# Patient Record
Sex: FEMALE | Race: BLACK OR AFRICAN AMERICAN | Employment: PART TIME | URBAN - METROPOLITAN AREA
[De-identification: names, ages, dates, MRNs, and addresses within clinical notes are randomized per-mention and may not be internally consistent; named-entity substitution may affect disease eponyms.]

---

## 2017-11-13 ENCOUNTER — HOSPITAL ENCOUNTER (OUTPATIENT)
Dept: VASCULAR LAB | Age: 37
Discharge: HOME | End: 2017-11-13
Attending: EMERGENCY MEDICINE | Admitting: EMERGENCY MEDICINE

## 2017-11-13 DIAGNOSIS — M79.604 PAIN OF RIGHT LEG: ICD-10-CM

## 2017-11-13 DIAGNOSIS — M79.661 RIGHT CALF PAIN: Primary | ICD-10-CM

## 2019-04-19 ENCOUNTER — APPOINTMENT (OUTPATIENT)
Dept: VASCULAR LAB | Age: 39
End: 2019-04-19
Payer: COMMERCIAL

## 2019-04-19 ENCOUNTER — HOSPITAL ENCOUNTER (EMERGENCY)
Age: 39
Discharge: HOME | End: 2019-04-19
Attending: EMERGENCY MEDICINE
Payer: COMMERCIAL

## 2019-04-19 VITALS
RESPIRATION RATE: 17 BRPM | HEIGHT: 61 IN | SYSTOLIC BLOOD PRESSURE: 142 MMHG | BODY MASS INDEX: 47.01 KG/M2 | OXYGEN SATURATION: 96 % | TEMPERATURE: 98.2 F | WEIGHT: 249 LBS | DIASTOLIC BLOOD PRESSURE: 83 MMHG | HEART RATE: 76 BPM

## 2019-04-19 DIAGNOSIS — M79.604 RIGHT LEG PAIN: Primary | ICD-10-CM

## 2019-04-19 PROCEDURE — 93971 EXTREMITY STUDY: CPT

## 2019-04-19 PROCEDURE — 99283 EMERGENCY DEPT VISIT LOW MDM: CPT

## 2019-04-19 RX ORDER — METHOCARBAMOL 500 MG/1
500 TABLET, FILM COATED ORAL 3 TIMES DAILY
Qty: 30 TABLET | Refills: 0 | Status: SHIPPED | OUTPATIENT
Start: 2019-04-19 | End: 2019-04-29

## 2019-04-19 RX ORDER — NAPROXEN 500 MG/1
500 TABLET ORAL 2 TIMES DAILY WITH MEALS
Qty: 28 TABLET | Refills: 0 | Status: SHIPPED | OUTPATIENT
Start: 2019-04-19 | End: 2019-05-03

## 2019-04-19 ASSESSMENT — ENCOUNTER SYMPTOMS
RESPIRATORY NEGATIVE: 1
EYES NEGATIVE: 1
GASTROINTESTINAL NEGATIVE: 1

## 2019-04-19 ASSESSMENT — PAIN SCALES - GENERAL: PAINLEVEL_OUTOF10: 7

## 2019-04-19 ASSESSMENT — PAIN DESCRIPTION - PAIN TYPE: TYPE: ACUTE PAIN

## 2019-04-19 NOTE — ED TRIAGE NOTES
Pt arrives with complaints of right lower leg pain. STates she always has some leg pain and it started getting worse in the right lower leg 2 weeks ago. States she can't stand, walk, or sleep due to the pain. Pt ambulated to the room from the waiting room.

## 2019-04-19 NOTE — ED PROVIDER NOTES
[address] [facility]          ATTENDING PHYSICIAN NOTE       Date of evaluation: 2019    Chief Complaint     Leg Pain      History of Present Illness     Gabriela Crespo is a 46 y.o. female who presents to the emergency department complaining of right leg pain. Patient states her last 2 weeks she has been noticing pain in her right leg. She denies any significant swelling of the leg. She denies any chest pain or shortness of breath. She states the pain is worse with movement. She has been taking ibuprofen over-the-counter without improvement of her symptoms. She denies any specific trauma. She states that over the last 2 days she has had difficulty sleeping because of the pain and cannot find a position of comfort which prompted her to come to the emergency department. She does report a history of a provoked DVT when she was pregnant several years ago. Review of Systems     Review of Systems   Constitutional: Negative. HENT: Negative. Eyes: Negative. Respiratory: Negative. Cardiovascular: Negative. Gastrointestinal: Negative. Genitourinary: Negative. Musculoskeletal:        See HPI   Neurological: Negative. All other systems reviewed and are negative. Past Medical, Surgical, Family, and Social History     She has a past medical history of PE (pulmonary embolism). She has a past surgical history that includes Dilation and curettage of uterus;  section (1/13/10); and  section. Her family history is not on file. She reports that she has never smoked. She has never used smokeless tobacco. She reports that she does not drink alcohol or use drugs. Medications     Previous Medications    No medications on file       Allergies     She has No Known Allergies. Physical Exam     INITIAL VITALS: BP: (!) 142/83, Temp: 98.2 °F (36.8 °C), Pulse: 76, Resp: 17, SpO2: 96 %    Physical Exam   Constitutional: No distress.    Obese Cardiovascular: Intact distal pulses. Musculoskeletal:        Right lower leg: She exhibits tenderness. She exhibits no bony tenderness. Tender to palpation along the medial head of the gastrocnemius muscle. Patient complains of pain with flexion and extension of the knee but not significant tenderness with flexion and extension of the ankle. There is no tenderness over the Achilles tendon. There is no significant swelling of the right lower extremity compared to the left. Nursing note and vitals reviewed. Diagnostic Results     RADIOLOGY:  VL Extremity Venous Right    (Results Pending)     RECENT VITALS:  BP: (!) 142/83,Temp: 98.2 °F (36.8 °C), Pulse: 76, Resp: 17, SpO2: 96 %     Procedures     N/A    ED Course     Nursing Notes, Past Medical Hx, Past Surgical Hx, Social Hx,Allergies, and Family Hx were reviewed. The patient was given the following medications:  No orders of the defined types were placed in this encounter. CONSULTS:  None    MEDICAL DECISIONMAKING / ASSESSMENT / PLAN     Keerthi Blunt is a 46 y.o. female who presents complaining of 2 weeks of pain to the right lower extremity. Patient does have tenderness along the medial head of the gastrocnemius muscle. There is no significant edema when compared to the left side. Patient does have a history of provoked DVT so a duplex will be obtained. If this is negative, the patient will follow-up for further evaluation. If positive, the patient will be restarted on anticoagulation. Patient does not currently have a primary care provider so will need to be referred to the [facility]. medicine clinic. Clinical Impression     No diagnosis found. Disposition     PATIENT REFERRED TO:  No follow-up provider specified.     DISCHARGE MEDICATIONS:  New Prescriptions    No medications on file       Jackson Kwok MD  04/19/19 9279

## 2019-04-19 NOTE — ED PROVIDER NOTES
[address] ENCOUNTER          ATTENDING PHYSICIAN NOTE       Date of evaluation: 4/19/2019    ADDENDUM:      Care of this patient was assumed from Dr. Qamar Epstein. The patient was seen for Leg Pain  . The patient's initial evaluation and plan have been discussed with the prior provider who initially evaluated the patient. Nursing Notes, Past Medical Hx, Past Surgical Hx, Social Hx, Allergies, and Family Hx were all reviewed. Diagnostic Results     EKG       RADIOLOGY:  VL Extremity Venous Right             LABS:   No results found for this visit on 04/19/19. RECENT VITALS:  BP: (!) 142/83, Temp: 98.2 °F (36.8 °C), Pulse: 76, Resp: 17, SpO2: 96 %     Procedures         ED Course     The patient was given the following medications:  Orders Placed This Encounter   Medications    methocarbamol (ROBAXIN) 500 MG tablet     Sig: Take 1 tablet by mouth 3 times daily for 10 days     Dispense:  30 tablet     Refill:  0    naproxen (NAPROXEN) 500 MG EC tablet     Sig: Take 1 tablet by mouth 2 times daily (with meals) for 14 days     Dispense:  28 tablet     Refill:  0       CONSULTS:  None    MEDICAL DECISION MAKING / ASSESSMENT / Lior Ofe is a 46 y.o. female who presents with right posterior leg pain. The patient was ordered a duplex of the right lower to me by Dr. Qamar Epstein for concern of DVT. Physical exam did not reveal significant signs for DVT however she had a history of provoked DVT. Duplex did not show any evidence of DVT. Her symptoms are most likely due to muscle/connective tissue injury. She will be started on short course of Robaxin, naproxen, given contact information for sports medicine as well as primary care follow-up. She was discharged home in good condition. Clinical Impression     1.  Right leg pain        Disposition     PATIENT REFERRED TO:  Mallory Tobias MD  [address]  [phone]      As needed, If symptoms [facility] AT [address]  [phone]    Schedule an appointment as soon as possible for a visit         DISCHARGE MEDICATIONS:  New Prescriptions    METHOCARBAMOL (ROBAXIN) 500 MG TABLET    Take 1 tablet by mouth 3 times daily for 10 days    NAPROXEN (NAPROXEN) 500 MG EC TABLET    Take 1 tablet by mouth 2 times daily (with meals) for 14 days       DISPOSITION Decision To Discharge 04/19/2019 07:59:25 AM       Esa Gonzales MD  04/19/19 0442

## 2020-11-01 ENCOUNTER — APPOINTMENT (OUTPATIENT)
Dept: GENERAL RADIOLOGY | Age: 40
End: 2020-11-01
Payer: COMMERCIAL

## 2020-11-01 ENCOUNTER — HOSPITAL ENCOUNTER (EMERGENCY)
Age: 40
Discharge: HOME | End: 2020-11-01
Payer: COMMERCIAL

## 2020-11-01 VITALS
RESPIRATION RATE: 18 BRPM | HEART RATE: 99 BPM | TEMPERATURE: 97.8 F | SYSTOLIC BLOOD PRESSURE: 141 MMHG | DIASTOLIC BLOOD PRESSURE: 79 MMHG | OXYGEN SATURATION: 98 %

## 2020-11-01 LAB — D DIMER: 224 NG/ML DDU (ref 0–229)

## 2020-11-01 PROCEDURE — 85379 FIBRIN DEGRADATION QUANT: CPT

## 2020-11-01 PROCEDURE — 73562 X-RAY EXAM OF KNEE 3: CPT

## 2020-11-01 PROCEDURE — 6360000002 HC RX W HCPCS: Performed by: PHYSICIAN ASSISTANT

## 2020-11-01 PROCEDURE — 99283 EMERGENCY DEPT VISIT LOW MDM: CPT

## 2020-11-01 PROCEDURE — 96372 THER/PROPH/DIAG INJ SC/IM: CPT

## 2020-11-01 RX ORDER — ACETAMINOPHEN 325 MG/1
650 TABLET ORAL EVERY 6 HOURS PRN
Qty: 60 TABLET | Refills: 0 | Status: SHIPPED | OUTPATIENT
Start: 2020-11-01

## 2020-11-01 RX ORDER — OXYCODONE HYDROCHLORIDE 5 MG/1
5 TABLET ORAL EVERY 8 HOURS PRN
Qty: 9 TABLET | Refills: 0 | Status: SHIPPED | OUTPATIENT
Start: 2020-11-01 | End: 2020-11-04

## 2020-11-01 RX ORDER — METHYLPREDNISOLONE 4 MG/1
TABLET ORAL
Qty: 1 KIT | Refills: 0 | Status: SHIPPED | OUTPATIENT
Start: 2020-11-01 | End: 2020-11-07

## 2020-11-01 RX ORDER — KETOROLAC TROMETHAMINE 30 MG/ML
15 INJECTION, SOLUTION INTRAMUSCULAR; INTRAVENOUS ONCE
Status: COMPLETED | OUTPATIENT
Start: 2020-11-01 | End: 2020-11-01

## 2020-11-01 RX ADMIN — KETOROLAC TROMETHAMINE 15 MG: 30 INJECTION, SOLUTION INTRAMUSCULAR at 12:46

## 2020-11-01 ASSESSMENT — PAIN SCALES - GENERAL
PAINLEVEL_OUTOF10: 10
PAINLEVEL_OUTOF10: 10

## 2020-11-01 ASSESSMENT — ENCOUNTER SYMPTOMS
CHEST TIGHTNESS: 0
BACK PAIN: 0
COUGH: 0
RESPIRATORY NEGATIVE: 1
SHORTNESS OF BREATH: 0

## 2020-11-01 NOTE — ED PROVIDER NOTES
Mental Status: She is alert and oriented to person, place, and time. Psychiatric:         Mood and Affect: Mood normal.         Behavior: Behavior normal.         Thought Content: Thought content normal.         Judgment: Judgment normal.         Diagnostic Results       RADIOLOGY:  XR KNEE RIGHT (3 VIEWS)   Final Result      Degenerative arthritis. Mild anterior soft tissue swelling. No other acute findings. LABS:   Results for orders placed or performed during the hospital encounter of 11/01/20   D-dimer, quantitative (Lab)   Result Value Ref Range    D-Dimer, Quant 224 0 - 229 ng/mL DDU           RECENT VITALS:  BP: (!) 141/79, Temp: 97.8 °F (36.6 °C), Pulse: 99, Resp: 18, SpO2: 98 %     Procedures         ED Course     Nursing Notes, Past Medical Hx,Past Surgical Hx, Social Hx, Allergies, and Family Hx were reviewed. The patient was given the following medications:  Orders Placed This Encounter   Medications    ketorolac (TORADOL) injection 15 mg    methylPREDNISolone (MEDROL, OLIVA,) 4 MG tablet     Sig: Take by mouth. Dispense:  1 kit     Refill:  0    oxyCODONE (ROXICODONE) 5 MG immediate release tablet     Sig: Take 1 tablet by mouth every 8 hours as needed for Pain for up to 3 days. WARNING: May cause drowsiness. May impair ability to operate a motor vehicle or machinery. Do not use in combination with alcohol     Dispense:  9 tablet     Refill:  0    acetaminophen (TYLENOL) 325 MG tablet     Sig: Take 2 tablets by mouth every 6 hours as needed for Pain     Dispense:  60 tablet     Refill:  0       CONSULTS:  None    MEDICAL DECISION MAKING / ASSESSMENT / Marylis Deleon is a 45 y.o. female who presented to the emergency department with right knee pain. On examination she is neurovascularly intact. No obvious deformities or dislocations. No erythema or warmth to palpation out because may be suspicious for secondary infection or septic joint.   She had no calf pain on

## 2020-11-01 NOTE — ED NOTES
Patient discharged to home via family. Written discharge instructions reviewed with understanding. Copy of AVS and signed prescription sent home with patient. Patient able to walk from ED without assistance.        Coy Norris RN  11/01/20 9855

## 2020-11-01 NOTE — ED TRIAGE NOTES
Pt is experiencing rt knee pain that runs down back of calf. Pain is causing pt to be unable to sleep.

## 2020-11-01 NOTE — ED NOTES
Bed: B21-21  Expected date:   Expected time:   Means of arrival:   Comments:  23 Darrius Lindsey RN  11/01/20 1214

## 2023-08-30 ENCOUNTER — HOSPITAL ENCOUNTER (EMERGENCY)
Age: 43
Discharge: HOME | End: 2023-08-30
Attending: EMERGENCY MEDICINE
Payer: COMMERCIAL

## 2023-08-30 VITALS
HEIGHT: 61 IN | HEART RATE: 72 BPM | WEIGHT: 250 LBS | TEMPERATURE: 98.2 F | OXYGEN SATURATION: 100 % | BODY MASS INDEX: 47.2 KG/M2 | RESPIRATION RATE: 20 BRPM | DIASTOLIC BLOOD PRESSURE: 100 MMHG | SYSTOLIC BLOOD PRESSURE: 150 MMHG

## 2023-08-30 DIAGNOSIS — R19.7 NAUSEA VOMITING AND DIARRHEA: Primary | ICD-10-CM

## 2023-08-30 DIAGNOSIS — R11.2 NAUSEA VOMITING AND DIARRHEA: Primary | ICD-10-CM

## 2023-08-30 LAB
ALBUMIN SERPL-MCNC: 4.2 G/DL (ref 3.4–5)
ALBUMIN/GLOB SERPL: 1.4 {RATIO} (ref 1.1–2.2)
ALP SERPL-CCNC: 51 U/L (ref 40–129)
ALT SERPL-CCNC: 19 U/L (ref 10–40)
ANION GAP SERPL CALCULATED.3IONS-SCNC: 7 MMOL/L (ref 3–16)
AST SERPL-CCNC: 23 U/L (ref 15–37)
BASOPHILS # BLD: 0 K/UL (ref 0–0.2)
BASOPHILS NFR BLD: 0.6 %
BILIRUB SERPL-MCNC: 0.5 MG/DL (ref 0–1)
BILIRUB UR QL STRIP.AUTO: NEGATIVE
BUN SERPL-MCNC: 11 MG/DL (ref 7–20)
CALCIUM SERPL-MCNC: 9.1 MG/DL (ref 8.3–10.6)
CHLORIDE SERPL-SCNC: 105 MMOL/L (ref 99–110)
CLARITY UR: CLEAR
CO2 SERPL-SCNC: 26 MMOL/L (ref 21–32)
COLOR UR: YELLOW
CREAT SERPL-MCNC: 0.9 MG/DL (ref 0.6–1.1)
DEPRECATED RDW RBC AUTO: 12.9 % (ref 12.4–15.4)
EOSINOPHIL # BLD: 0.2 K/UL (ref 0–0.6)
EOSINOPHIL NFR BLD: 4.5 %
GFR SERPLBLD CREATININE-BSD FMLA CKD-EPI: >60 ML/MIN/{1.73_M2}
GLUCOSE SERPL-MCNC: 103 MG/DL (ref 70–99)
GLUCOSE UR STRIP.AUTO-MCNC: NEGATIVE MG/DL
HCG SERPL QL: NEGATIVE
HCT VFR BLD AUTO: 37.6 % (ref 36–48)
HGB BLD-MCNC: 12.5 G/DL (ref 12–16)
HGB UR QL STRIP.AUTO: NEGATIVE
KETONES UR STRIP.AUTO-MCNC: NEGATIVE MG/DL
LEUKOCYTE ESTERASE UR QL STRIP.AUTO: NEGATIVE
LIPASE SERPL-CCNC: 24 U/L (ref 13–60)
LYMPHOCYTES # BLD: 1.7 K/UL (ref 1–5.1)
LYMPHOCYTES NFR BLD: 36.2 %
MCH RBC QN AUTO: 30.1 PG (ref 26–34)
MCHC RBC AUTO-ENTMCNC: 33.3 G/DL (ref 31–36)
MCV RBC AUTO: 90.5 FL (ref 80–100)
MONOCYTES # BLD: 0.3 K/UL (ref 0–1.3)
MONOCYTES NFR BLD: 7.2 %
NEUTROPHILS # BLD: 2.4 K/UL (ref 1.7–7.7)
NEUTROPHILS NFR BLD: 51.5 %
NITRITE UR QL STRIP.AUTO: NEGATIVE
PH UR STRIP.AUTO: 7 [PH] (ref 5–8)
PLATELET # BLD AUTO: 348 K/UL (ref 135–450)
PMV BLD AUTO: 7.6 FL (ref 5–10.5)
POTASSIUM SERPL-SCNC: 3.8 MMOL/L (ref 3.5–5.1)
PROT SERPL-MCNC: 7.2 G/DL (ref 6.4–8.2)
PROT UR STRIP.AUTO-MCNC: NEGATIVE MG/DL
RBC # BLD AUTO: 4.16 M/UL (ref 4–5.2)
SODIUM SERPL-SCNC: 138 MMOL/L (ref 136–145)
SP GR UR STRIP.AUTO: 1.02 (ref 1–1.03)
UA COMPLETE W REFLEX CULTURE PNL UR: NORMAL
UA DIPSTICK W REFLEX MICRO PNL UR: NORMAL
URN SPEC COLLECT METH UR: NORMAL
UROBILINOGEN UR STRIP-ACNC: 0.2 E.U./DL
WBC # BLD AUTO: 4.7 K/UL (ref 4–11)

## 2023-08-30 PROCEDURE — 99284 EMERGENCY DEPT VISIT MOD MDM: CPT

## 2023-08-30 PROCEDURE — 81003 URINALYSIS AUTO W/O SCOPE: CPT

## 2023-08-30 PROCEDURE — 96374 THER/PROPH/DIAG INJ IV PUSH: CPT

## 2023-08-30 PROCEDURE — 85025 COMPLETE CBC W/AUTO DIFF WBC: CPT

## 2023-08-30 PROCEDURE — 2580000003 HC RX 258: Performed by: EMERGENCY MEDICINE

## 2023-08-30 PROCEDURE — 84703 CHORIONIC GONADOTROPIN ASSAY: CPT

## 2023-08-30 PROCEDURE — 6360000002 HC RX W HCPCS: Performed by: EMERGENCY MEDICINE

## 2023-08-30 PROCEDURE — 80053 COMPREHEN METABOLIC PANEL: CPT

## 2023-08-30 PROCEDURE — 83690 ASSAY OF LIPASE: CPT

## 2023-08-30 RX ORDER — SODIUM CHLORIDE, SODIUM LACTATE, POTASSIUM CHLORIDE, AND CALCIUM CHLORIDE .6; .31; .03; .02 G/100ML; G/100ML; G/100ML; G/100ML
1000 INJECTION, SOLUTION INTRAVENOUS ONCE
Status: COMPLETED | OUTPATIENT
Start: 2023-08-30 | End: 2023-08-30

## 2023-08-30 RX ORDER — ONDANSETRON 4 MG/1
4 TABLET, FILM COATED ORAL EVERY 8 HOURS PRN
Qty: 20 TABLET | Refills: 0 | Status: SHIPPED | OUTPATIENT
Start: 2023-08-30 | End: 2023-08-30 | Stop reason: SDUPTHER

## 2023-08-30 RX ORDER — ONDANSETRON 2 MG/ML
4 INJECTION INTRAMUSCULAR; INTRAVENOUS ONCE
Status: COMPLETED | OUTPATIENT
Start: 2023-08-30 | End: 2023-08-30

## 2023-08-30 RX ORDER — ONDANSETRON 4 MG/1
4 TABLET, FILM COATED ORAL EVERY 8 HOURS PRN
Qty: 20 TABLET | Refills: 0 | Status: SHIPPED | OUTPATIENT
Start: 2023-08-30

## 2023-08-30 RX ADMIN — SODIUM CHLORIDE, POTASSIUM CHLORIDE, SODIUM LACTATE AND CALCIUM CHLORIDE 1000 ML: 600; 310; 30; 20 INJECTION, SOLUTION INTRAVENOUS at 09:51

## 2023-08-30 RX ADMIN — ONDANSETRON 4 MG: 2 INJECTION INTRAMUSCULAR; INTRAVENOUS at 09:51

## 2023-08-30 NOTE — DISCHARGE INSTRUCTIONS
Return to emergency department for worsening symptoms or other concerns. Come back if you have pain, nausea is not helped with the Zofran, or any other problems. Drink plenty of fluids to maintain hydration is much as you can, and start with a relatively bland diet, things like crackers toast low-fat low spice food and you can then advance as tolerated.

## 2024-01-02 ENCOUNTER — HOSPITAL ENCOUNTER (EMERGENCY)
Age: 44
Discharge: HOME | End: 2024-01-02
Attending: EMERGENCY MEDICINE
Payer: COMMERCIAL

## 2024-01-02 ENCOUNTER — APPOINTMENT (OUTPATIENT)
Dept: CT IMAGING | Age: 44
End: 2024-01-02
Payer: COMMERCIAL

## 2024-01-02 VITALS
OXYGEN SATURATION: 97 % | SYSTOLIC BLOOD PRESSURE: 123 MMHG | BODY MASS INDEX: 48.5 KG/M2 | TEMPERATURE: 98.8 F | DIASTOLIC BLOOD PRESSURE: 68 MMHG | HEART RATE: 94 BPM | RESPIRATION RATE: 18 BRPM | WEIGHT: 256.9 LBS | HEIGHT: 61 IN

## 2024-01-02 DIAGNOSIS — L02.211 ABDOMINAL WALL ABSCESS: Primary | ICD-10-CM

## 2024-01-02 LAB
ALBUMIN SERPL-MCNC: 4.3 G/DL (ref 3.4–5)
ALBUMIN/GLOB SERPL: 1.3 {RATIO} (ref 1.1–2.2)
ALP SERPL-CCNC: 57 U/L (ref 40–129)
ALT SERPL-CCNC: 17 U/L (ref 10–40)
ANION GAP SERPL CALCULATED.3IONS-SCNC: 9 MMOL/L (ref 3–16)
AST SERPL-CCNC: 19 U/L (ref 15–37)
BILIRUB SERPL-MCNC: 0.4 MG/DL (ref 0–1)
BUN SERPL-MCNC: 12 MG/DL (ref 7–20)
CALCIUM SERPL-MCNC: 9.3 MG/DL (ref 8.3–10.6)
CHLORIDE SERPL-SCNC: 105 MMOL/L (ref 99–110)
CO2 SERPL-SCNC: 26 MMOL/L (ref 21–32)
CREAT SERPL-MCNC: 0.8 MG/DL (ref 0.6–1.1)
GFR SERPLBLD CREATININE-BSD FMLA CKD-EPI: >60 ML/MIN/{1.73_M2}
GLUCOSE SERPL-MCNC: 93 MG/DL (ref 70–99)
HCG UR QL: NEGATIVE
LACTATE BLDV-SCNC: 0.8 MMOL/L (ref 0.4–2)
POTASSIUM SERPL-SCNC: 4 MMOL/L (ref 3.5–5.1)
PROT SERPL-MCNC: 7.6 G/DL (ref 6.4–8.2)
SODIUM SERPL-SCNC: 140 MMOL/L (ref 136–145)

## 2024-01-02 PROCEDURE — 74177 CT ABD & PELVIS W/CONTRAST: CPT

## 2024-01-02 PROCEDURE — 80053 COMPREHEN METABOLIC PANEL: CPT

## 2024-01-02 PROCEDURE — 6360000004 HC RX CONTRAST MEDICATION: Performed by: EMERGENCY MEDICINE

## 2024-01-02 PROCEDURE — 83605 ASSAY OF LACTIC ACID: CPT

## 2024-01-02 PROCEDURE — 84703 CHORIONIC GONADOTROPIN ASSAY: CPT

## 2024-01-02 PROCEDURE — 99285 EMERGENCY DEPT VISIT HI MDM: CPT

## 2024-01-02 RX ORDER — SULFAMETHOXAZOLE AND TRIMETHOPRIM 800; 160 MG/1; MG/1
1 TABLET ORAL 2 TIMES DAILY
Qty: 14 TABLET | Refills: 0 | Status: SHIPPED | OUTPATIENT
Start: 2024-01-02 | End: 2024-01-09

## 2024-01-02 RX ADMIN — IOPAMIDOL 75 ML: 755 INJECTION, SOLUTION INTRAVENOUS at 14:24

## 2024-01-02 ASSESSMENT — PAIN DESCRIPTION - LOCATION: LOCATION: ABDOMEN

## 2024-01-02 ASSESSMENT — PAIN - FUNCTIONAL ASSESSMENT
PAIN_FUNCTIONAL_ASSESSMENT: NONE - DENIES PAIN
PAIN_FUNCTIONAL_ASSESSMENT: 0-10

## 2024-01-02 ASSESSMENT — PAIN SCALES - GENERAL: PAINLEVEL_OUTOF10: 8

## 2024-01-02 ASSESSMENT — PAIN DESCRIPTION - DESCRIPTORS: DESCRIPTORS: ACHING

## 2024-01-02 ASSESSMENT — PAIN DESCRIPTION - PAIN TYPE: TYPE: ACUTE PAIN

## 2024-01-02 ASSESSMENT — PAIN DESCRIPTION - ORIENTATION: ORIENTATION: RIGHT

## 2024-01-02 ASSESSMENT — PAIN DESCRIPTION - FREQUENCY: FREQUENCY: CONTINUOUS

## 2024-01-02 NOTE — ED PROVIDER NOTES
density, possibly phlegmon, but no evidence for drainable fluid collection or rim-enhancing abscess           EMERGENCY DEPARTMENT COURSE and DIFFERENTIAL DIAGNOSIS/MDM:     Vitals:    Vitals:    01/02/24 1258   BP: 123/68   Pulse: 94   Resp: 18   Temp: 98.8 °F (37.1 °C)   TempSrc: Oral   SpO2: 97%   Weight: 116.5 kg (256 lb 14.4 oz)   Height: 1.549 m (5' 1\")        Patient was given the following medications:   Medications   iopamidol (ISOVUE-370) 76 % injection 75 mL (75 mLs IntraVENous Given 1/2/24 1424)             CC/HPI Summary, DDx, ED Course, and Reassessment:     Patient presents to the emergency department with right sided abdominal wall abscess.  Patient request wound check.  On exam, patient is afebrile with normal vital signs.  She has what appears to be draining abscess of the right lateral abdominal wall.  Mild overlying erythema.  Wound is indurated without fluctuance.  Labs are stable.  CT abdomen/pelvis was completed to ensure no bowel involvement.  Phlegmon without drainable abscess reported.  Patient will continue on oral antibiotics and follow-up with PCP as recommended.       CONSULTS: None   Social Determinants: None   Chronic Conditions: NA    Disposition Considerations: None     I am the primary physician of Record.     FINAL IMPRESSION    1. Abdominal wall abscess         DISPOSITION/PLAN   DISPOSITION Decision To Discharge 01/02/2024 02:57:29 PM       PATIENT REFERRED TO:   Tao Mendiola MD  [address] [facility]  [address]  [facility] [address]  [phone]    In 3 days      [facility] Emergency Department  [address] [facility] [address]  [phone]    If symptoms worsen     DISCHARGE MEDICATIONS:   Discharge Medication List as of 1/2/2024  3:03 PM        START taking these medications    Details   sulfamethoxazole-trimethoprim (BACTRIM DS;SEPTRA DS) 800-160 MG per tablet Take 1 tablet by mouth 2 times daily for 7 days, Disp-14 tablet, R-0Normal            DISCONTINUED

## 2024-01-02 NOTE — DISCHARGE INSTR - COC
I certify the above information and transfer of Courtney Steiner  is necessary for the continuing treatment of the diagnosis listed and that she requires {Admit to Appropriate Level of Care:57989} for {GREATER/LESS:252283911} 30 days.     Update Admission H&P: {CHP DME Changes in HandP:848160300}    PHYSICIAN SIGNATURE:  {Esignature:208920765}

## 2024-03-15 ENCOUNTER — HOSPITAL ENCOUNTER (EMERGENCY)
Age: 44
Discharge: HOME | End: 2024-03-15
Attending: EMERGENCY MEDICINE
Payer: COMMERCIAL

## 2024-03-15 VITALS
HEART RATE: 83 BPM | BODY MASS INDEX: 49.43 KG/M2 | HEIGHT: 62 IN | RESPIRATION RATE: 12 BRPM | WEIGHT: 268.6 LBS | OXYGEN SATURATION: 100 % | DIASTOLIC BLOOD PRESSURE: 84 MMHG | TEMPERATURE: 97.6 F | SYSTOLIC BLOOD PRESSURE: 143 MMHG

## 2024-03-15 DIAGNOSIS — G56.01 CARPAL TUNNEL SYNDROME OF RIGHT WRIST: Primary | ICD-10-CM

## 2024-03-15 DIAGNOSIS — R03.0 ELEVATED BLOOD PRESSURE READING: ICD-10-CM

## 2024-03-15 PROCEDURE — 99283 EMERGENCY DEPT VISIT LOW MDM: CPT

## 2024-03-15 RX ORDER — ACETAMINOPHEN 500 MG
500 TABLET ORAL EVERY 6 HOURS PRN
Qty: 30 TABLET | Refills: 0 | Status: SHIPPED | OUTPATIENT
Start: 2024-03-15

## 2024-03-15 RX ORDER — NAPROXEN 375 MG/1
375 TABLET ORAL 2 TIMES DAILY WITH MEALS
Qty: 20 TABLET | Refills: 0 | Status: SHIPPED | OUTPATIENT
Start: 2024-03-15

## 2024-03-15 ASSESSMENT — PAIN SCALES - GENERAL
PAINLEVEL_OUTOF10: 10
PAINLEVEL_OUTOF10: 10

## 2024-03-15 ASSESSMENT — PAIN - FUNCTIONAL ASSESSMENT: PAIN_FUNCTIONAL_ASSESSMENT: 0-10

## 2024-03-15 ASSESSMENT — PAIN DESCRIPTION - DESCRIPTORS: DESCRIPTORS: SHARP

## 2024-03-15 ASSESSMENT — PAIN DESCRIPTION - LOCATION
LOCATION: HAND
LOCATION: HAND

## 2024-03-15 ASSESSMENT — PAIN DESCRIPTION - PAIN TYPE
TYPE: ACUTE PAIN
TYPE: ACUTE PAIN

## 2024-03-15 ASSESSMENT — PAIN DESCRIPTION - ORIENTATION
ORIENTATION: RIGHT
ORIENTATION: RIGHT

## 2024-03-15 NOTE — ED PROVIDER NOTES
the patient reproduces her symptoms exactly.  Her cap refill is normal.  Radial pulses normal.  Sensation light touch is intact.  Radial, median, ulnar nerve motor functions intact.  SKIN: Warm and dry.  No rash  NEUROLOGICAL: Normal speech and thought      DIAGNOSTIC RESULTS   LABS:   Labs Reviewed - No data to display   When ordered only abnormal lab results are displayed. All other labs were within normal range or not returned as of this dictation.     EKG:      RADIOLOGY:    Non-plain film images such as CT, Ultrasound and MRI are read by the radiologist. Plain radiographic images are visualized and preliminarily interpreted by the ED Provider with the below findings:      Interpretation per the Radiologist below, if available at the time of this note:  No results found.         EMERGENCY DEPARTMENT COURSE and DIFFERENTIAL DIAGNOSIS/MDM:     Vitals:    Vitals:    03/15/24 1320   BP: (!) 143/84   Pulse: 83   Resp: 12   Temp: 97.6 °F (36.4 °C)   TempSrc: Oral   SpO2: 100%   Weight: 121.8 kg (268 lb 9.6 oz)   Height: 1.575 m (5' 2\")        Patient was given the following medications:   Medications - No data to display          CC/HPI Summary, DDx, ED Course, and Reassessment: At this time I do not suspect a fracture or dislocation.  There is no cellulitis.  Her pulses are normal so do not suspect an arterial injury.  The patient's Tinel's test is positive and reproduces her symptoms.  At this time I feel the patient has carpal tunnel.  She will be started into a brace.  Nonsteroidals and Tylenol.  Follow-up with her primary care provider in 1 week see how she is doing and also to recheck her elevated blood pressure.    CONSULTS: None   Social Determinants: None   Chronic Conditions: NA    Disposition Considerations: None      I am the primary physician of Record.     FINAL IMPRESSION    1. Carpal tunnel syndrome of right wrist    2. Elevated blood pressure reading         DISPOSITION/PLAN   DISPOSITION Discharge -  Pending Orders Complete 03/15/2024 01:31:37 PM       PATIENT REFERRED TO:   Tao Mendiola MD  [address] [id number]  [phone]    Schedule an appointment as soon as possible for a visit in 1 week  For Blood Pressure recheck     DISCHARGE MEDICATIONS:   New Prescriptions    ACETAMINOPHEN (TYLENOL) 500 MG TABLET    Take 1 tablet by mouth every 6 hours as needed for Pain    NAPROXEN (NAPROSYN) 375 MG TABLET    Take 1 tablet by mouth 2 times daily (with meals)      DISCONTINUED MEDICATIONS:   Discontinued Medications    No medications on file            (Please note that portions of this note were completed with a voice recognition program.  Efforts were made to edit the dictations but occasionally words are mis-transcribed.)     Toro Miller MD (electronically signed)         Toro Miller MD  03/15/24 2259

## 2024-03-19 ENCOUNTER — HOSPITAL ENCOUNTER (EMERGENCY)
Age: 44
Discharge: HOME | End: 2024-03-19
Attending: EMERGENCY MEDICINE
Payer: COMMERCIAL

## 2024-03-19 VITALS
HEART RATE: 77 BPM | RESPIRATION RATE: 16 BRPM | OXYGEN SATURATION: 98 % | BODY MASS INDEX: 47.84 KG/M2 | TEMPERATURE: 98.4 F | DIASTOLIC BLOOD PRESSURE: 92 MMHG | HEIGHT: 62 IN | SYSTOLIC BLOOD PRESSURE: 150 MMHG | WEIGHT: 260 LBS

## 2024-03-19 DIAGNOSIS — H11.422 CHEMOSIS OF LEFT CONJUNCTIVA: ICD-10-CM

## 2024-03-19 DIAGNOSIS — B34.9 NONSPECIFIC SYNDROME SUGGESTIVE OF VIRAL ILLNESS: Primary | ICD-10-CM

## 2024-03-19 LAB
BACTERIA URNS QL MICRO: ABNORMAL /HPF
BILIRUB UR QL STRIP.AUTO: NEGATIVE
CLARITY UR: ABNORMAL
COLOR UR: YELLOW
EPI CELLS #/AREA URNS HPF: ABNORMAL /HPF (ref 0–5)
GLUCOSE UR STRIP.AUTO-MCNC: NEGATIVE MG/DL
HCG UR QL: NEGATIVE
HGB UR QL STRIP.AUTO: ABNORMAL
KETONES UR STRIP.AUTO-MCNC: 15 MG/DL
LEUKOCYTE ESTERASE UR QL STRIP.AUTO: NEGATIVE
NITRITE UR QL STRIP.AUTO: NEGATIVE
PH UR STRIP.AUTO: 6.5 [PH] (ref 5–8)
PROT UR STRIP.AUTO-MCNC: 30 MG/DL
RBC #/AREA URNS HPF: >100 /HPF (ref 0–4)
SP GR UR STRIP.AUTO: 1.02 (ref 1–1.03)
UA COMPLETE W REFLEX CULTURE PNL UR: ABNORMAL
UA DIPSTICK W REFLEX MICRO PNL UR: YES
URN SPEC COLLECT METH UR: ABNORMAL
UROBILINOGEN UR STRIP-ACNC: 1 E.U./DL
WBC #/AREA URNS HPF: ABNORMAL /HPF (ref 0–5)

## 2024-03-19 PROCEDURE — 84703 CHORIONIC GONADOTROPIN ASSAY: CPT

## 2024-03-19 PROCEDURE — 81001 URINALYSIS AUTO W/SCOPE: CPT

## 2024-03-19 PROCEDURE — 6370000000 HC RX 637 (ALT 250 FOR IP): Performed by: EMERGENCY MEDICINE

## 2024-03-19 PROCEDURE — 99283 EMERGENCY DEPT VISIT LOW MDM: CPT

## 2024-03-19 RX ORDER — ONDANSETRON 4 MG/1
4 TABLET, ORALLY DISINTEGRATING ORAL 3 TIMES DAILY PRN
Qty: 21 TABLET | Refills: 0 | Status: SHIPPED | OUTPATIENT
Start: 2024-03-19

## 2024-03-19 RX ORDER — ACETAMINOPHEN 325 MG/1
650 TABLET ORAL ONCE
Status: COMPLETED | OUTPATIENT
Start: 2024-03-19 | End: 2024-03-19

## 2024-03-19 RX ORDER — ONDANSETRON 4 MG/1
8 TABLET, ORALLY DISINTEGRATING ORAL ONCE
Status: COMPLETED | OUTPATIENT
Start: 2024-03-19 | End: 2024-03-19

## 2024-03-19 RX ADMIN — ONDANSETRON 8 MG: 4 TABLET, ORALLY DISINTEGRATING ORAL at 12:14

## 2024-03-19 RX ADMIN — ACETAMINOPHEN 650 MG: 325 TABLET ORAL at 12:14

## 2024-03-19 NOTE — ED PROVIDER NOTES
[facility] EMERGENCY DEPARTMENT  EMERGENCY DEPARTMENT ENCOUNTER      Pt Name: Courtney Steiner  MRN: [id number]  Birthdate 1980  Date of evaluation: 3/19/2024  Provider: Brennen Borges MD    CHIEF COMPLAINT       Chief Complaint   Patient presents with    Emesis     Pt c/o N/V/D since Monday. States she had chicken on Sunday and thinks she may have food poisoning.         HISTORY OF PRESENT ILLNESS   (Location/Symptom, Timing/Onset, Context/Setting, Quality, Duration, Modifying Factors, Severity)  Note limiting factors.   Courtney Steiner is a 44 y.o. female who presents to the emergency department with the chief complaint of   Chief Complaint   Patient presents with    Emesis     Pt c/o N/V/D since Monday. States she had chicken on Sunday and thinks she may have food poisoning.   . 44-year-old female with past medical history as listed below and recent diagnosis of carpal tunnel syndrome presents to the ER for evaluation of constellation of complaints.  Patient states she has had nausea and several episodes of nonbilious nonbloody vomiting.  Patient states this started on Monday after she ate what she thought was some bad food.  Symptoms are also associated with loose watery yellow diarrhea.  Patient also describes intermittent chills, a frontal headache, myalgias.  Patient denies measured fevers, shortness of breath, chest pain, urinary symptoms or vaginal discharge.  Of note patient states she had her eyes evaluated last week and since then she has noticed a small film on the lateral aspect of her left eye.  Patient denies changes in vision, pain or trauma to the area.        Nursing Notes were reviewed.    REVIEW OF SYSTEMS    (2-9 systems for level 4, 10 or more for level 5)     Review of Systems   All other systems reviewed and are negative.      Except as noted above the remainder of the review of systems was reviewed and negative.       PAST MEDICAL HISTORY     Past Medical History:   Diagnosis Date    PE

## 2024-03-19 NOTE — DISCHARGE INSTRUCTIONS
Your urinalysis did not show any obvious signs of dehydration.  You have a small amount of ketones in your urine.  Please take Zofran for your nausea and vomiting.  Of note it will help slightly with the diarrhea as it does cause constipation sometimes.  If symptoms yourprogress despite treatment please come back to the ER for repeat evaluation

## 2024-08-24 ENCOUNTER — HOSPITAL ENCOUNTER (EMERGENCY)
Age: 44
Discharge: HOME | End: 2024-08-24
Attending: EMERGENCY MEDICINE

## 2024-08-24 VITALS
RESPIRATION RATE: 16 BRPM | WEIGHT: 268.74 LBS | TEMPERATURE: 98.3 F | BODY MASS INDEX: 50.74 KG/M2 | OXYGEN SATURATION: 98 % | HEIGHT: 61 IN | SYSTOLIC BLOOD PRESSURE: 141 MMHG | DIASTOLIC BLOOD PRESSURE: 75 MMHG | HEART RATE: 88 BPM

## 2024-08-24 DIAGNOSIS — N93.8 DUB (DYSFUNCTIONAL UTERINE BLEEDING): Primary | ICD-10-CM

## 2024-08-24 LAB
ALBUMIN SERPL-MCNC: 4.2 G/DL (ref 3.4–5)
ALBUMIN/GLOB SERPL: 1.4 {RATIO} (ref 1.1–2.2)
ALP SERPL-CCNC: 55 U/L (ref 40–129)
ALT SERPL-CCNC: 30 U/L (ref 10–40)
ANION GAP SERPL CALCULATED.3IONS-SCNC: 10 MMOL/L (ref 3–16)
AST SERPL-CCNC: 29 U/L (ref 15–37)
BACTERIA URNS QL MICRO: ABNORMAL /HPF
BASOPHILS # BLD: 0.1 K/UL (ref 0–0.2)
BASOPHILS NFR BLD: 0.9 %
BILIRUB SERPL-MCNC: 0.3 MG/DL (ref 0–1)
BILIRUB UR QL STRIP.AUTO: NEGATIVE
BUN SERPL-MCNC: 14 MG/DL (ref 7–20)
CALCIUM SERPL-MCNC: 8.9 MG/DL (ref 8.3–10.6)
CHLORIDE SERPL-SCNC: 105 MMOL/L (ref 99–110)
CLARITY UR: CLEAR
CO2 SERPL-SCNC: 21 MMOL/L (ref 21–32)
COLOR UR: YELLOW
CREAT SERPL-MCNC: 0.7 MG/DL (ref 0.6–1.1)
DEPRECATED RDW RBC AUTO: 14.4 % (ref 12.4–15.4)
EOSINOPHIL # BLD: 0.2 K/UL (ref 0–0.6)
EOSINOPHIL NFR BLD: 2.9 %
EPI CELLS #/AREA URNS HPF: ABNORMAL /HPF (ref 0–5)
GFR SERPLBLD CREATININE-BSD FMLA CKD-EPI: >90 ML/MIN/{1.73_M2}
GLUCOSE SERPL-MCNC: 101 MG/DL (ref 70–99)
GLUCOSE UR STRIP.AUTO-MCNC: NEGATIVE MG/DL
HCG UR QL: NEGATIVE
HCT VFR BLD AUTO: 31.4 % (ref 36–48)
HGB BLD-MCNC: 10.3 G/DL (ref 12–16)
HGB UR QL STRIP.AUTO: ABNORMAL
KETONES UR STRIP.AUTO-MCNC: NEGATIVE MG/DL
LEUKOCYTE ESTERASE UR QL STRIP.AUTO: NEGATIVE
LYMPHOCYTES # BLD: 2.3 K/UL (ref 1–5.1)
LYMPHOCYTES NFR BLD: 38.6 %
MCH RBC QN AUTO: 27.1 PG (ref 26–34)
MCHC RBC AUTO-ENTMCNC: 32.7 G/DL (ref 31–36)
MCV RBC AUTO: 82.9 FL (ref 80–100)
MONOCYTES # BLD: 0.4 K/UL (ref 0–1.3)
MONOCYTES NFR BLD: 7.1 %
NEUTROPHILS # BLD: 3 K/UL (ref 1.7–7.7)
NEUTROPHILS NFR BLD: 50.5 %
NITRITE UR QL STRIP.AUTO: POSITIVE
PH UR STRIP.AUTO: 7 [PH] (ref 5–8)
PLATELET # BLD AUTO: 390 K/UL (ref 135–450)
PMV BLD AUTO: 7.8 FL (ref 5–10.5)
POTASSIUM SERPL-SCNC: 4.2 MMOL/L (ref 3.5–5.1)
PROT SERPL-MCNC: 7.3 G/DL (ref 6.4–8.2)
PROT UR STRIP.AUTO-MCNC: NEGATIVE MG/DL
RBC # BLD AUTO: 3.79 M/UL (ref 4–5.2)
RBC #/AREA URNS HPF: >100 /HPF (ref 0–4)
SODIUM SERPL-SCNC: 136 MMOL/L (ref 136–145)
SP GR UR STRIP.AUTO: 1.02 (ref 1–1.03)
UA DIPSTICK W REFLEX MICRO PNL UR: YES
URN SPEC COLLECT METH UR: ABNORMAL
UROBILINOGEN UR STRIP-ACNC: 1 E.U./DL
WBC # BLD AUTO: 5.8 K/UL (ref 4–11)
WBC #/AREA URNS HPF: ABNORMAL /HPF (ref 0–5)

## 2024-08-24 PROCEDURE — 84703 CHORIONIC GONADOTROPIN ASSAY: CPT

## 2024-08-24 PROCEDURE — 2580000003 HC RX 258: Performed by: EMERGENCY MEDICINE

## 2024-08-24 PROCEDURE — 81001 URINALYSIS AUTO W/SCOPE: CPT

## 2024-08-24 PROCEDURE — 85025 COMPLETE CBC W/AUTO DIFF WBC: CPT

## 2024-08-24 PROCEDURE — 99284 EMERGENCY DEPT VISIT MOD MDM: CPT

## 2024-08-24 PROCEDURE — 80053 COMPREHEN METABOLIC PANEL: CPT

## 2024-08-24 RX ORDER — 0.9 % SODIUM CHLORIDE 0.9 %
1000 INTRAVENOUS SOLUTION INTRAVENOUS ONCE
Status: COMPLETED | OUTPATIENT
Start: 2024-08-24 | End: 2024-08-24

## 2024-08-24 RX ORDER — ONDANSETRON 4 MG/1
4 TABLET, FILM COATED ORAL EVERY 8 HOURS PRN
Qty: 10 TABLET | Refills: 0 | Status: SHIPPED | OUTPATIENT
Start: 2024-08-24

## 2024-08-24 RX ORDER — MEDROXYPROGESTERONE ACETATE 10 MG
10 TABLET ORAL DAILY
Status: DISCONTINUED | OUTPATIENT
Start: 2024-08-24 | End: 2024-08-24 | Stop reason: RX

## 2024-08-24 RX ORDER — MEDROXYPROGESTERONE ACETATE 10 MG
10 TABLET ORAL DAILY
Qty: 30 TABLET | Refills: 0 | Status: SHIPPED | OUTPATIENT
Start: 2024-08-24

## 2024-08-24 RX ADMIN — SODIUM CHLORIDE 1000 ML: 0.9 INJECTION, SOLUTION INTRAVENOUS at 13:00

## 2024-08-24 ASSESSMENT — ENCOUNTER SYMPTOMS
SHORTNESS OF BREATH: 0
VOMITING: 0
BLOOD IN STOOL: 0
SORE THROAT: 0
COLOR CHANGE: 0
PHOTOPHOBIA: 0
BACK PAIN: 0
RECTAL PAIN: 0
WHEEZING: 0
APNEA: 0
DIARRHEA: 0
CHEST TIGHTNESS: 0
TROUBLE SWALLOWING: 0
SINUS PRESSURE: 0
ABDOMINAL DISTENTION: 0
EYE DISCHARGE: 0
EYE REDNESS: 0
EYE PAIN: 0
COUGH: 0
ABDOMINAL PAIN: 1
VOICE CHANGE: 0
RHINORRHEA: 0
STRIDOR: 0
NAUSEA: 0
CONSTIPATION: 0

## 2024-08-24 ASSESSMENT — PAIN DESCRIPTION - DESCRIPTORS: DESCRIPTORS: ACHING

## 2024-08-24 ASSESSMENT — PAIN SCALES - GENERAL: PAINLEVEL_OUTOF10: 9

## 2024-08-24 ASSESSMENT — PAIN - FUNCTIONAL ASSESSMENT: PAIN_FUNCTIONAL_ASSESSMENT: 0-10

## 2024-08-24 ASSESSMENT — PAIN DESCRIPTION - FREQUENCY: FREQUENCY: CONTINUOUS

## 2024-08-24 NOTE — ED NOTES
Pelvic exam completed, blood dripping from vagina to floor, multiple blood clots. Pelvic exam completed per EMD, pt tolerated without difficulty.

## 2024-08-24 NOTE — ED PROVIDER NOTES
for color change and rash.   Neurological:  Positive for dizziness. Negative for tremors, seizures, syncope, facial asymmetry, speech difficulty, weakness, light-headedness, numbness and headaches.   Hematological:  Negative for adenopathy. Does not bruise/bleed easily.   Psychiatric/Behavioral:  Negative for agitation, confusion, dysphoric mood, hallucinations, self-injury, sleep disturbance and suicidal ideas.    All other systems reviewed and are negative.       Physical Exam  Constitutional:       General: She is not in acute distress.     Appearance: Normal appearance. She is obese. She is not ill-appearing.   HENT:      Head: Normocephalic and atraumatic.   Eyes:      Extraocular Movements: Extraocular movements intact.      Pupils: Pupils are equal, round, and reactive to light.   Abdominal:      General: Bowel sounds are normal.      Palpations: Abdomen is soft.   Skin:     General: Skin is warm and dry.      Capillary Refill: Capillary refill takes less than 2 seconds.   Neurological:      General: No focal deficit present.      Mental Status: She is alert and oriented to person, place, and time.   Psychiatric:         Mood and Affect: Mood normal.         Behavior: Behavior normal.     Consultation with Gynecologist, Dr. Starr at 1:30 pm  He recommends Provera 10-20 mg/day. He will follow her up in the office on Monday.     MDM    Results for orders placed or performed during the hospital encounter of 08/24/24   Pregnancy, Urine   Result Value Ref Range    Pregnancy, Urine Negative Detects HCG level >20 MIU/mL   Urinalysis   Result Value Ref Range    Color, UA Yellow Straw/Yellow    Clarity, UA Clear Clear    Glucose, Ur Negative Negative mg/dL    Bilirubin, Urine Negative Negative    Ketones, Urine Negative Negative mg/dL    Specific Gravity, UA 1.020 1.005 - 1.030    Blood, Urine LARGE (A) Negative    pH, Urine 7.0 5.0 - 8.0    Protein, UA Negative Negative mg/dL    Urobilinogen, Urine 1.0 <2.0 E.U./dL     Nitrite, Urine POSITIVE (A) Negative    Leukocyte Esterase, Urine Negative Negative    Microscopic Examination YES     Urine Type NotGiven    Microscopic Urinalysis   Result Value Ref Range    WBC, UA 3-5 0 - 5 /HPF    RBC, UA >100 (A) 0 - 4 /HPF    Epithelial Cells, UA 2-5 0 - 5 /HPF    Bacteria, UA 3+ (A) None Seen /HPF   CBC with Auto Differential   Result Value Ref Range    WBC 5.8 4.0 - 11.0 K/uL    RBC 3.79 (L) 4.00 - 5.20 M/uL    Hemoglobin 10.3 (L) 12.0 - 16.0 g/dL    Hematocrit 31.4 (L) 36.0 - 48.0 %    MCV 82.9 80.0 - 100.0 fL    MCH 27.1 26.0 - 34.0 pg    MCHC 32.7 31.0 - 36.0 g/dL    RDW 14.4 12.4 - 15.4 %    Platelets 390 135 - 450 K/uL    MPV 7.8 5.0 - 10.5 fL    Neutrophils % 50.5 %    Lymphocytes % 38.6 %    Monocytes % 7.1 %    Eosinophils % 2.9 %    Basophils % 0.9 %    Neutrophils Absolute 3.0 1.7 - 7.7 K/uL    Lymphocytes Absolute 2.3 1.0 - 5.1 K/uL    Monocytes Absolute 0.4 0.0 - 1.3 K/uL    Eosinophils Absolute 0.2 0.0 - 0.6 K/uL    Basophils Absolute 0.1 0.0 - 0.2 K/uL   Comprehensive Metabolic Panel w/ Reflex to MG   Result Value Ref Range    Sodium 136 136 - 145 mmol/L    Potassium reflex Magnesium 4.2 3.5 - 5.1 mmol/L    Chloride 105 99 - 110 mmol/L    CO2 21 21 - 32 mmol/L    Anion Gap 10 3 - 16    Glucose 101 (H) 70 - 99 mg/dL    BUN 14 7 - 20 mg/dL    Creatinine 0.7 0.6 - 1.1 mg/dL    Est, Glom Filt Rate >90 >60    Calcium 8.9 8.3 - 10.6 mg/dL    Total Protein 7.3 6.4 - 8.2 g/dL    Albumin 4.2 3.4 - 5.0 g/dL    Albumin/Globulin Ratio 1.4 1.1 - 2.2    Total Bilirubin 0.3 0.0 - 1.0 mg/dL    Alkaline Phosphatase 55 40 - 129 U/L    ALT 30 10 - 40 U/L    AST 29 15 - 37 U/L       I estimate there is LOW risk for ACUTE APPENDICITIS, BOWEL OBSTRUCTION, CHOLECYSTITIS, DIVERTICULITIS, INCARCERATED HERNIA, PANCREATITIS, PELVIC INFLAMMATORY DISEASE, PERFORATED BOWEL or ULCER, PREGNANCY, or TUBO-OVARIAN ABSCESS, thus I consider the discharge disposition reasonable. Also, there is no evidence or  peritonitis, sepsis, or toxicity. Courtney Steiner and I have discussed the diagnosis and risks, and we agree with discharging home to follow-up with their primary doctor. We also discussed returning to the Emergency Department immediately if new or worsening symptoms occur. We have discussed the symptoms which are most concerning (e.g., bloody stool, fever, changing or worsening pain, vomiting) that necessitate immediate return.     Final Impression    1. DUB (dysfunctional uterine bleeding)        Blood pressure (!) 141/75, pulse 88, temperature 98.3 °F (36.8 °C), temperature source Oral, resp. rate 16, height 1.549 m (5' 1\"), weight 121.9 kg (268 lb 11.9 oz), last menstrual period 06/15/2024, SpO2 98%, unknown if currently breastfeeding.             Mira Blanc MD  08/24/24 7930

## 2024-08-24 NOTE — ED TRIAGE NOTES
Vaginal bleeding since July. Had normal period in June and then started bleeding in July, got worse on Monday, + suprapubic pain. Denies dysuria. Has used 4 pads since 0300, feels weak. Skin w/d, resp easy.

## 2024-08-24 NOTE — DISCHARGE INSTRUCTIONS
Take 10 mg of Provera daily. However, if your bleeding worsens you can take up to 20 mg/day.   Encourage clear liquids.  Off work through Monday. Call on Monday morning to schedule your Monday appointment with Dr. Presley.   Return if symptoms change or worsen.

## 2024-08-24 NOTE — ED NOTES
Patient given d/c instructions with return verbalization including medications. Emphasis on getting Rx ASAP. To call Monday AM to get f/u appt set up, work note given until Tuesday. Pt to return with any worsening s/s. Clean pads in place. Patient feels like she can drive home without difficulty. Ambulated to lobby with steady gait.

## 2024-09-08 ENCOUNTER — HOSPITAL ENCOUNTER (EMERGENCY)
Age: 44
Discharge: HOME | End: 2024-09-08
Attending: STUDENT IN AN ORGANIZED HEALTH CARE EDUCATION/TRAINING PROGRAM

## 2024-09-08 VITALS
HEART RATE: 90 BPM | DIASTOLIC BLOOD PRESSURE: 99 MMHG | OXYGEN SATURATION: 98 % | BODY MASS INDEX: 47.24 KG/M2 | TEMPERATURE: 97.1 F | SYSTOLIC BLOOD PRESSURE: 168 MMHG | WEIGHT: 250 LBS | RESPIRATION RATE: 18 BRPM

## 2024-09-08 DIAGNOSIS — N93.8 DYSFUNCTIONAL UTERINE BLEEDING: Primary | ICD-10-CM

## 2024-09-08 LAB
ANION GAP SERPL CALCULATED.3IONS-SCNC: 13 MMOL/L (ref 3–16)
BACTERIA URNS QL MICRO: ABNORMAL /HPF
BASOPHILS # BLD: 0 K/UL (ref 0–0.2)
BASOPHILS NFR BLD: 0.5 %
BILIRUB UR QL STRIP.AUTO: NEGATIVE
BUN SERPL-MCNC: 14 MG/DL (ref 7–20)
CALCIUM SERPL-MCNC: 8.8 MG/DL (ref 8.3–10.6)
CHLORIDE SERPL-SCNC: 103 MMOL/L (ref 99–110)
CLARITY UR: CLEAR
CO2 SERPL-SCNC: 21 MMOL/L (ref 21–32)
COLOR UR: YELLOW
CREAT SERPL-MCNC: 1.1 MG/DL (ref 0.6–1.1)
DEPRECATED RDW RBC AUTO: 14.7 % (ref 12.4–15.4)
EOSINOPHIL # BLD: 0.2 K/UL (ref 0–0.6)
EOSINOPHIL NFR BLD: 2.2 %
GFR SERPLBLD CREATININE-BSD FMLA CKD-EPI: 64 ML/MIN/{1.73_M2}
GLUCOSE SERPL-MCNC: 101 MG/DL (ref 70–99)
GLUCOSE UR STRIP.AUTO-MCNC: 100 MG/DL
HCG SERPL QL: NEGATIVE
HCT VFR BLD AUTO: 32.8 % (ref 36–48)
HGB BLD-MCNC: 10.1 G/DL (ref 12–16)
HGB UR QL STRIP.AUTO: ABNORMAL
KETONES UR STRIP.AUTO-MCNC: 15 MG/DL
LEUKOCYTE ESTERASE UR QL STRIP.AUTO: ABNORMAL
LYMPHOCYTES # BLD: 2.5 K/UL (ref 1–5.1)
LYMPHOCYTES NFR BLD: 35.6 %
MCH RBC QN AUTO: 26 PG (ref 26–34)
MCHC RBC AUTO-ENTMCNC: 30.6 G/DL (ref 31–36)
MCV RBC AUTO: 85 FL (ref 80–100)
MONOCYTES # BLD: 0.5 K/UL (ref 0–1.3)
MONOCYTES NFR BLD: 6.4 %
NEUTROPHILS # BLD: 3.9 K/UL (ref 1.7–7.7)
NEUTROPHILS NFR BLD: 55.3 %
NITRITE UR QL STRIP.AUTO: POSITIVE
PH UR STRIP.AUTO: 6.5 [PH] (ref 5–8)
PLATELET # BLD AUTO: 501 K/UL (ref 135–450)
PMV BLD AUTO: 7.7 FL (ref 5–10.5)
POTASSIUM SERPL-SCNC: 4.1 MMOL/L (ref 3.5–5.1)
PROT UR STRIP.AUTO-MCNC: >=300 MG/DL
RBC # BLD AUTO: 3.86 M/UL (ref 4–5.2)
RBC #/AREA URNS HPF: >100 /HPF (ref 0–4)
SODIUM SERPL-SCNC: 137 MMOL/L (ref 136–145)
SP GR UR STRIP.AUTO: 1.02 (ref 1–1.03)
UA COMPLETE W REFLEX CULTURE PNL UR: ABNORMAL
UA DIPSTICK W REFLEX MICRO PNL UR: YES
URN SPEC COLLECT METH UR: ABNORMAL
UROBILINOGEN UR STRIP-ACNC: 4 E.U./DL
WBC # BLD AUTO: 7.1 K/UL (ref 4–11)
WBC #/AREA URNS HPF: ABNORMAL /HPF (ref 0–5)

## 2024-09-08 PROCEDURE — 84703 CHORIONIC GONADOTROPIN ASSAY: CPT

## 2024-09-08 PROCEDURE — 99283 EMERGENCY DEPT VISIT LOW MDM: CPT

## 2024-09-08 PROCEDURE — 85025 COMPLETE CBC W/AUTO DIFF WBC: CPT

## 2024-09-08 PROCEDURE — 80048 BASIC METABOLIC PNL TOTAL CA: CPT

## 2024-09-08 PROCEDURE — 81001 URINALYSIS AUTO W/SCOPE: CPT

## 2024-09-08 RX ORDER — TRANEXAMIC ACID 650 MG/1
650 TABLET ORAL 3 TIMES DAILY
Qty: 15 TABLET | Refills: 0 | Status: SHIPPED | OUTPATIENT
Start: 2024-09-08 | End: 2024-09-13

## 2024-09-08 ASSESSMENT — PAIN DESCRIPTION - PAIN TYPE: TYPE: ACUTE PAIN

## 2024-09-08 ASSESSMENT — PAIN DESCRIPTION - LOCATION: LOCATION: ABDOMEN

## 2024-09-08 ASSESSMENT — PAIN DESCRIPTION - FREQUENCY: FREQUENCY: CONTINUOUS

## 2024-09-08 ASSESSMENT — PAIN SCALES - GENERAL: PAINLEVEL_OUTOF10: 7

## 2024-09-08 ASSESSMENT — PAIN - FUNCTIONAL ASSESSMENT: PAIN_FUNCTIONAL_ASSESSMENT: 0-10

## 2024-09-08 ASSESSMENT — PAIN DESCRIPTION - DESCRIPTORS: DESCRIPTORS: CRAMPING

## 2025-01-19 ENCOUNTER — HOSPITAL ENCOUNTER (EMERGENCY)
Age: 45
Discharge: HOME | End: 2025-01-19
Attending: STUDENT IN AN ORGANIZED HEALTH CARE EDUCATION/TRAINING PROGRAM

## 2025-01-19 VITALS
TEMPERATURE: 98.2 F | SYSTOLIC BLOOD PRESSURE: 132 MMHG | RESPIRATION RATE: 16 BRPM | WEIGHT: 265.4 LBS | HEART RATE: 85 BPM | BODY MASS INDEX: 50.11 KG/M2 | OXYGEN SATURATION: 99 % | DIASTOLIC BLOOD PRESSURE: 80 MMHG | HEIGHT: 61 IN

## 2025-01-19 DIAGNOSIS — B35.6 TINEA CRURIS: ICD-10-CM

## 2025-01-19 DIAGNOSIS — L30.9 DERMATITIS: Primary | ICD-10-CM

## 2025-01-19 PROCEDURE — 99283 EMERGENCY DEPT VISIT LOW MDM: CPT

## 2025-01-19 RX ORDER — KETOCONAZOLE 20 MG/G
CREAM TOPICAL
Qty: 30 G | Refills: 1 | Status: SHIPPED | OUTPATIENT
Start: 2025-01-19

## 2025-01-19 ASSESSMENT — PAIN DESCRIPTION - DESCRIPTORS: DESCRIPTORS: BURNING

## 2025-01-19 ASSESSMENT — PAIN - FUNCTIONAL ASSESSMENT
PAIN_FUNCTIONAL_ASSESSMENT: 0-10
PAIN_FUNCTIONAL_ASSESSMENT: PREVENTS OR INTERFERES SOME ACTIVE ACTIVITIES AND ADLS

## 2025-01-19 ASSESSMENT — PAIN DESCRIPTION - LOCATION: LOCATION: RECTUM

## 2025-01-19 ASSESSMENT — PAIN DESCRIPTION - FREQUENCY: FREQUENCY: CONTINUOUS

## 2025-01-19 ASSESSMENT — PAIN SCALES - GENERAL: PAINLEVEL_OUTOF10: 10

## 2025-01-19 ASSESSMENT — PAIN DESCRIPTION - ONSET: ONSET: ON-GOING

## 2025-01-19 ASSESSMENT — PAIN DESCRIPTION - PAIN TYPE: TYPE: ACUTE PAIN

## 2025-01-19 NOTE — DISCHARGE INSTRUCTIONS
You were evaluated in the emergency department for rash. Assessments and testing completed during your visit were reassuring and at this time there is no indication for further testing, treatment or admission to the hospital. Given this it is appropriate to discharge you from the emergency department. At the time of discharge we discussed the following:    At this time we will treat for a yeast infection with a topical ointment.  Please take as prescribed and expect your condition to improve.  Certainly if you develop new or worsening symptoms you may return anytime for reevaluation    Please note that sometimes it is difficult to diagnose a medical condition early in the disease process before the disease is fully manifest. Because of this, should you develop any new or worsening symptoms, you may return at any time to the emergency department for another evaluation. If available you are also recommended to review this visit with your primary care physician or other medical provider in the next 7 days. Thank you for allowing us to care for you today.

## 2025-01-19 NOTE — ED PROVIDER NOTES
[facility] EMERGENCY DEPARTMENT     EMERGENCY DEPARTMENT ENCOUNTER         Pt Name: Courtney Steiner   MRN: [id number]   Birthdate 1980   Date of evaluation: 2025   Provider: Peng Wayne MD   PCP: No primary care provider on file.   Note Started: 4:21 PM EST 25       Chief Complaint     Rash (Rectum )      History of Present Illness     Courtney Steiner is a 44 y.o. female who presents with painful itchy rash within the gluteal cleft.  The condition has been progressive over the past 3 weeks.  The patient has been treating with various topical emollients without success and given this presents for evaluation.  She has no other acute complaints      Review of Systems     Positives and pertinent negatives as per HPI.    Past Medical, Surgical, Family, and Social History     She has a past medical history of PE (pulmonary embolism).  She has a past surgical history that includes Dilation and curettage of uterus;  section (1/13/10); and  section.  Her family history is not on file.  She reports that she has never smoked. She has never used smokeless tobacco. She reports that she does not drink alcohol and does not use drugs.    SCREENINGS:          Emerson Coma Scale  Eye Opening: Spontaneous  Best Verbal Response: Oriented  Best Motor Response: Obeys commands  Lev Coma Scale Score: 15                        CIWA Assessment  BP: 132/80  Pulse: 85               Medications     Previous Medications    ACETAMINOPHEN (TYLENOL) 500 MG TABLET    Take 1 tablet by mouth every 6 hours as needed for Pain    MEDROXYPROGESTERONE (PROVERA) 10 MG TABLET    Take 1 tablet by mouth daily    NAPROXEN (NAPROSYN) 375 MG TABLET    Take 1 tablet by mouth 2 times daily (with meals)    ONDANSETRON (ZOFRAN) 4 MG TABLET    Take 1 tablet by mouth every 8 hours as needed for Nausea    TRANEXAMIC ACID (LYSTEDA) 650 MG TABS TABLET    Take 1 tablet by mouth 3 times daily for 5 days       Allergies     She has

## 2025-05-14 NOTE — ED NOTES
Patient given prescription, work note, discharge instructions verbal and written, patient verbalized understanding.  Alert/oriented X4, Clear speech.  Patient exhibits no distress, ambulates with steady gait per self leaving unit, no further request.   
Patient to ed with complaints of a abscess on the right side of her abd which started 1 month ago, abscess drained today.  
no

## 2025-08-09 ENCOUNTER — APPOINTMENT (OUTPATIENT)
Dept: GENERAL RADIOLOGY | Age: 45
End: 2025-08-09
Payer: COMMERCIAL

## 2025-08-09 ENCOUNTER — APPOINTMENT (OUTPATIENT)
Dept: CT IMAGING | Age: 45
End: 2025-08-09
Payer: COMMERCIAL

## 2025-08-09 ENCOUNTER — HOSPITAL ENCOUNTER (EMERGENCY)
Age: 45
Discharge: HOME | End: 2025-08-09
Attending: STUDENT IN AN ORGANIZED HEALTH CARE EDUCATION/TRAINING PROGRAM
Payer: COMMERCIAL

## 2025-08-09 VITALS
BODY MASS INDEX: 49.56 KG/M2 | OXYGEN SATURATION: 100 % | SYSTOLIC BLOOD PRESSURE: 133 MMHG | RESPIRATION RATE: 20 BRPM | HEART RATE: 86 BPM | TEMPERATURE: 98 F | HEIGHT: 61 IN | WEIGHT: 262.5 LBS | DIASTOLIC BLOOD PRESSURE: 85 MMHG

## 2025-08-09 DIAGNOSIS — R06.89 DYSPNEA AND RESPIRATORY ABNORMALITIES: Primary | ICD-10-CM

## 2025-08-09 DIAGNOSIS — D64.9 ANEMIA, UNSPECIFIED TYPE: ICD-10-CM

## 2025-08-09 DIAGNOSIS — R06.00 DYSPNEA AND RESPIRATORY ABNORMALITIES: Primary | ICD-10-CM

## 2025-08-09 LAB
ANION GAP SERPL CALCULATED.3IONS-SCNC: 9 MMOL/L (ref 3–16)
APTT BLD: 25.7 SEC (ref 22.8–35.8)
BASOPHILS # BLD: 0 K/UL (ref 0–0.2)
BASOPHILS NFR BLD: 0.6 %
BUN SERPL-MCNC: 11 MG/DL (ref 7–20)
CALCIUM SERPL-MCNC: 8.7 MG/DL (ref 8.3–10.6)
CHLORIDE SERPL-SCNC: 105 MMOL/L (ref 99–110)
CO2 SERPL-SCNC: 24 MMOL/L (ref 21–32)
CREAT SERPL-MCNC: 0.9 MG/DL (ref 0.6–1.1)
D-DIMER QUANTITATIVE: 0.55 UG/ML FEU (ref 0–0.6)
DEPRECATED RDW RBC AUTO: 15.2 % (ref 12.4–15.4)
EKG ATRIAL RATE: 79 BPM
EKG DIAGNOSIS: NORMAL
EKG P AXIS: 40 DEGREES
EKG P-R INTERVAL: 170 MS
EKG Q-T INTERVAL: 380 MS
EKG QRS DURATION: 68 MS
EKG QTC CALCULATION (BAZETT): 435 MS
EKG R AXIS: 55 DEGREES
EKG T AXIS: 36 DEGREES
EKG VENTRICULAR RATE: 79 BPM
EOSINOPHIL # BLD: 0.2 K/UL (ref 0–0.6)
EOSINOPHIL NFR BLD: 2.8 %
FLUAV RNA RESP QL NAA+PROBE: NOT DETECTED
FLUBV RNA RESP QL NAA+PROBE: NOT DETECTED
GFR SERPLBLD CREATININE-BSD FMLA CKD-EPI: 81 ML/MIN/{1.73_M2}
GLUCOSE SERPL-MCNC: 106 MG/DL (ref 70–99)
HCG SERPL QL: NEGATIVE
HCT VFR BLD AUTO: 24 % (ref 36–48)
HGB BLD-MCNC: 7.8 G/DL (ref 12–16)
INR PPP: 1.05 (ref 0.86–1.14)
LYMPHOCYTES # BLD: 1.8 K/UL (ref 1–5.1)
LYMPHOCYTES NFR BLD: 25.9 %
MCH RBC QN AUTO: 27.9 PG (ref 26–34)
MCHC RBC AUTO-ENTMCNC: 32.6 G/DL (ref 31–36)
MCV RBC AUTO: 85.7 FL (ref 80–100)
MONOCYTES # BLD: 0.4 K/UL (ref 0–1.3)
MONOCYTES NFR BLD: 6 %
NEUTROPHILS # BLD: 4.4 K/UL (ref 1.7–7.7)
NEUTROPHILS NFR BLD: 64.7 %
NT-PROBNP SERPL-MCNC: 69 PG/ML (ref 0–124)
PLATELET # BLD AUTO: 431 K/UL (ref 135–450)
PMV BLD AUTO: 7.2 FL (ref 5–10.5)
POTASSIUM SERPL-SCNC: 4.2 MMOL/L (ref 3.5–5.1)
PROTHROMBIN TIME: 14 SEC (ref 12.1–14.9)
RBC # BLD AUTO: 2.8 M/UL (ref 4–5.2)
SARS-COV-2 RNA RESP QL NAA+PROBE: NOT DETECTED
SODIUM SERPL-SCNC: 138 MMOL/L (ref 136–145)
TROPONIN, HIGH SENSITIVITY: <6 NG/L (ref 0–14)
TROPONIN, HIGH SENSITIVITY: <6 NG/L (ref 0–14)
WBC # BLD AUTO: 6.9 K/UL (ref 4–11)

## 2025-08-09 PROCEDURE — 85025 COMPLETE CBC W/AUTO DIFF WBC: CPT

## 2025-08-09 PROCEDURE — 93005 ELECTROCARDIOGRAM TRACING: CPT | Performed by: STUDENT IN AN ORGANIZED HEALTH CARE EDUCATION/TRAINING PROGRAM

## 2025-08-09 PROCEDURE — 71275 CT ANGIOGRAPHY CHEST: CPT

## 2025-08-09 PROCEDURE — 71046 X-RAY EXAM CHEST 2 VIEWS: CPT

## 2025-08-09 PROCEDURE — 84484 ASSAY OF TROPONIN QUANT: CPT

## 2025-08-09 PROCEDURE — 80048 BASIC METABOLIC PNL TOTAL CA: CPT

## 2025-08-09 PROCEDURE — 36415 COLL VENOUS BLD VENIPUNCTURE: CPT

## 2025-08-09 PROCEDURE — 85610 PROTHROMBIN TIME: CPT

## 2025-08-09 PROCEDURE — 85379 FIBRIN DEGRADATION QUANT: CPT

## 2025-08-09 PROCEDURE — 85730 THROMBOPLASTIN TIME PARTIAL: CPT

## 2025-08-09 PROCEDURE — 87636 SARSCOV2 & INF A&B AMP PRB: CPT

## 2025-08-09 PROCEDURE — 83880 ASSAY OF NATRIURETIC PEPTIDE: CPT

## 2025-08-09 PROCEDURE — 99285 EMERGENCY DEPT VISIT HI MDM: CPT

## 2025-08-09 PROCEDURE — 6360000004 HC RX CONTRAST MEDICATION: Performed by: STUDENT IN AN ORGANIZED HEALTH CARE EDUCATION/TRAINING PROGRAM

## 2025-08-09 PROCEDURE — 84703 CHORIONIC GONADOTROPIN ASSAY: CPT

## 2025-08-09 RX ORDER — IOPAMIDOL 755 MG/ML
75 INJECTION, SOLUTION INTRAVASCULAR
Status: COMPLETED | OUTPATIENT
Start: 2025-08-09 | End: 2025-08-09

## 2025-08-09 RX ADMIN — IOPAMIDOL 75 ML: 755 INJECTION, SOLUTION INTRAVENOUS at 10:26

## 2025-08-09 ASSESSMENT — ENCOUNTER SYMPTOMS
NAUSEA: 0
SHORTNESS OF BREATH: 1
COUGH: 0
VOMITING: 0